# Patient Record
Sex: MALE | Race: WHITE | NOT HISPANIC OR LATINO | Employment: FULL TIME | URBAN - METROPOLITAN AREA
[De-identification: names, ages, dates, MRNs, and addresses within clinical notes are randomized per-mention and may not be internally consistent; named-entity substitution may affect disease eponyms.]

---

## 2023-03-01 LAB
MUCUS, URINE: NORMAL /LPF
RBC, URINE: 1 /HPF (ref 0–5)
SQUAMOUS EPITHELIAL CELLS, URINE: 1 /HPF
WBC, URINE: <1 /HPF (ref 0–5)

## 2023-03-02 LAB
CHLAMYDIA TRACH., AMPLIFIED: NEGATIVE
N. GONORRHEA, AMPLIFIED: NEGATIVE

## 2023-03-03 LAB — URINE CULTURE: ABNORMAL

## 2023-03-08 LAB — UREAPLASMA/MYCOPLASMA CULTURE: NORMAL

## 2023-11-06 ENCOUNTER — APPOINTMENT (OUTPATIENT)
Dept: DERMATOLOGY | Facility: HOSPITAL | Age: 38
End: 2023-11-06
Payer: COMMERCIAL

## 2023-12-20 ENCOUNTER — OFFICE VISIT (OUTPATIENT)
Dept: BEHAVIORAL HEALTH | Facility: CLINIC | Age: 38
End: 2023-12-20
Payer: COMMERCIAL

## 2023-12-20 VITALS — DIASTOLIC BLOOD PRESSURE: 59 MMHG | SYSTOLIC BLOOD PRESSURE: 127 MMHG | HEART RATE: 62 BPM

## 2023-12-20 DIAGNOSIS — F11.20 OPIOID USE DISORDER, SEVERE, DEPENDENCE (MULTI): Primary | ICD-10-CM

## 2023-12-20 DIAGNOSIS — F11.11 HX OF OPIOID ABUSE (MULTI): ICD-10-CM

## 2023-12-20 LAB
AMPHETAMINES UR QL SCN: ABNORMAL
BARBITURATES UR QL SCN: ABNORMAL
BENZODIAZ UR QL SCN: ABNORMAL
BZE UR QL SCN: ABNORMAL
CANNABINOIDS UR QL SCN: ABNORMAL
FENTANYL+NORFENTANYL UR QL SCN: ABNORMAL
OPIATES UR QL SCN: ABNORMAL
OXYCODONE+OXYMORPHONE UR QL SCN: ABNORMAL
PCP UR QL SCN: ABNORMAL

## 2023-12-20 PROCEDURE — 80354 DRUG SCREENING FENTANYL: CPT | Performed by: NURSE PRACTITIONER

## 2023-12-20 PROCEDURE — 90791 PSYCH DIAGNOSTIC EVALUATION: CPT | Performed by: COUNSELOR

## 2023-12-20 PROCEDURE — 80307 DRUG TEST PRSMV CHEM ANLYZR: CPT | Performed by: NURSE PRACTITIONER

## 2023-12-21 ENCOUNTER — APPOINTMENT (OUTPATIENT)
Dept: BEHAVIORAL HEALTH | Facility: CLINIC | Age: 38
End: 2023-12-21
Payer: COMMERCIAL

## 2023-12-21 NOTE — PROGRESS NOTES
ARS ASSESSMENT      NAME: Dada Delgado  MRN: [id number]  DATE: 12/21/23      Reason for Visit:   Dada is referred by a member of the Addiction Treatment community, John Garcia of [facility]. Dada overdosed on a fentanyl pill last weekend and had to be revived by the paramedics.     Diagnoses/Problems:  There is no problem list on file for this patient.     Provider Impression: Dada is a 38 year old male who is accompanied today by his girlfriend and his mother.  Dada overdosed on Sunday on a fentanyl pill.  He has a long history of polysubstance dependence. In recent years, he has mostly been using illicit Subutex. He used Subutex for 7 years, following 4 years of heavy use of oxycodone. He stopped the Subutex in October, 2023. He transferred over to [facility] and has been using Kratom daily for the past 3 months. On Sunday, he took a pill from the street and overdosed. He has used some drug on a daily basis for the past 25 years of his young life. His pattern of opioid use is quite unusual in that he transferred himself off of oxycodone and took illicit Subutex for 7 years. He now wants to stop all drugs.  However, he is ambivalent in that he believes that he cannot function, especially at work, without some version of opioid. In the end, he agreed to enter into the morning IOP program.  It is important that he see our CNP as soon as possible in order to discuss medication management. He believes he is experiencing protracted withdrawal from the Subutex withdrawal. But he stopped the Subutex in October. And, he is depressed at this time and may benefit from medication management. He will start tomorrow in IOP.     Level of Care Assessment:  D1: Acute Intx/Withdrawal Potential: 2  D2: Biomedical Conditions/Complications: 0  D3: Emtional Behavioral/Cog. Conditions Complications: 2  D4: Treatment Acceptance/Resistance: 1  D5: Relapse/Cont. Use/Cont. Problem Potential: 1  D6: Recovery Environment:  "0    Level of Care Recommended: Recommended LOC IOP  Level of Care Placed: IOP    Comments:     Readiness For Treatment:  preparation    Substance Use History:  Alcohol Drank in his 20's. Arrested for DUI at age 23. He states that his drinking these past 5 years has been very minimal. Maybe takes one drink one time per month.   Cocaine Used cocaine almost every weekend from age 21 - 31. No cocaine use in recent years.   Opioids Heavy illicit oxycodone use for 4 years. Used 150 - 270 mg oxycodone per day. Switched to illicit Subutex at age 31 and used 8 mg Subutex every day until October, 2023. Switched to Kratom and used 12 capsules of Kratom daily for past 3 months. Took an illicit fentanyl pill last Sunday and overdosed.  Cannabis: Used cannabis daily from age 14 to 36. Allegedly stopped cannabis 2 years ago.     Impact on Daily Life: home disruption and work disruption    History of Treatment:  No    Other Behaviors:  none    Past Psychiatric History:  Past psychiatric history Patient has never had any mental health treatment of pilo kind. States that he has struggled with depression for many years. He believes he has ADHD and thinks opioids help him work.  Never diagnosed other than by himself. Never had any panic attacks or suicidal ideation. Currently feeling ashamed and remorseful about his overdose.    Suicidal Ideation:  No  Suicidal Attempts:  No  Suicide Protective Factors:  cultural/Muslim beliefs, family/social support, no prior history of attempts, and future oriented  Neuropsychological Factors:  None noted    Psych Social History ARS:  Born and raised in \"[place] and [place]. Has one sibling. Graduated from high school. 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 Has a girlfriend whom he plans to propose to.   Abuse/Neglect History:  None  Relationship History:  currently in a relationship  Sexual History:  Sexual orientation Heterosexual   Education:  Vocational " training    Employment History:  job not satisfying  Full-time    History:  no history of  affiliation  Legal History:  Life time arrests DUI age 23.   Financial Stressors:    Cultural/Episcopalian/Spiritual Orientation:  Pentecostal. Practicing some.   Leisure/Recreation/Hobbies:    Collateral Information:    Past Medical History:  History    Surgical History:  No past surgical history on file.  Family History:  No family history on file.  Allergies:  Not on File  Current Meds:  No current outpatient medications on file.   Vitals:  There is no height or weight on file to calculate BSA.    Mental Status Exam:  General Appearance: fairly groomed  Attitude/Behavior: cooperative  Motor: no psychomotor agitation or retardation, no tremor or other abnormal movements  Speech: normal rate, volume, prosody  Gait/Station: WFL  Mood: euthymic, a bit anxious  Affect: anxious  Thought Process: linear, goal directed  Thought Associations: no loosening of associations  Thought Content: normal  Perception: no perceptual abnormalities noted  Sensorium: alert and oriented to person, place, time and situation  Insight: limited  Judgment: limited  Cognition: cognitively intact to conversational testing with respect to attention, orientation, fund of knowledge, recent and remote memory, and language  Testing: N/A      Pain Scale:  3  Pain Quality: unable to describe   Does your pain make it hard to do any of these things that you normally do?  work  Vitals:  There is no height or weight on file to calculate BSA.    Tobacco Screening:   Social History     Tobacco Use   Smoking Status Not on file   Smokeless Tobacco Not on file       Domestic Violence:      Elder Abuse:  No   Depression/Suicide Screening:      CSSR-S Score: Negative     PHQ-9 Score: 24    SIDDHARTH-7 Score: NA    Nutrition Screening:    Social Determinates of Health:  Social Determinants of Health     Tobacco Use: Not on file   Alcohol Use: Not on file   Financial  Resource Strain: Not on file   Food Insecurity: Not on file   Transportation Needs: Not on file   Physical Activity: Not on file   Stress: Not on file   Social Connections: Not on file   Intimate Partner Violence: Not on file   Depression: Not on file   Housing Stability: Not on file   Utilities: Not on file   Digital Equity: Not on file       Results Data:

## 2023-12-22 LAB — ETHYL GLUCURONIDE UR QL SCN: NEGATIVE NG/ML

## 2023-12-26 LAB
FENTANYL UR CFM-MCNC: <2.5 NG/ML
NORFENTANYL UR CFM-MCNC: 30.8 NG/ML

## 2024-01-16 NOTE — PROGRESS NOTES
ARS ASSESSMENT      NAME: Dada Delgado  MRN: [id number]  DATE: 01/16/24      Reason for Visit:   Dada is referred by a member of the Addiction Treatment community, John Garcia of [facility]. Dada overdosed on a fentanyl pill last weekend and had to be revived by the paramedics.     Diagnoses/Problems:  There is no problem list on file for this patient.     Provider Impression: Dada is a 38 year old male who is accompanied today by his girlfriend and his mother.  Dada overdosed on Sunday on a fentanyl pill.  He has a long history of polysubstance dependence. In recent years, he has mostly been using illicit Subutex. He used Subutex for 7 years, following 4 years of heavy use of oxycodone. He stopped the Subutex in October, 2023. He transferred over to [facility] and has been using Kratom daily for the past 3 months. On Sunday, he took a pill from the street and overdosed. He has used some drug on a daily basis for the past 25 years of his young life. His pattern of opioid use is quite unusual in that he transferred himself off of oxycodone and took illicit Subutex for 7 years. He now wants to stop all drugs.  However, he is ambivalent in that he believes that he cannot function, especially at work, without some version of opioid. In the end, he agreed to enter into the morning IOP program.  It is important that he see our CNP as soon as possible in order to discuss medication management. He believes he is experiencing protracted withdrawal from the Subutex withdrawal. But he stopped the Subutex in October. And, he is depressed at this time and may benefit from medication management. He will start tomorrow in IOP.     Level of Care Assessment:  D1: Acute Intx/Withdrawal Potential: 2  D2: Biomedical Conditions/Complications: 0  D3: Emtional Behavioral/Cog. Conditions Complications: 2  D4: Treatment Acceptance/Resistance: 1  D5: Relapse/Cont. Use/Cont. Problem Potential: 1  D6: Recovery Environment:  "0    Level of Care Recommended: Recommended LOC IOP  Level of Care Placed: IOP    Comments:     Readiness For Treatment:  preparation    Substance Use History:  Alcohol Drank in his 20's. Arrested for DUI at age 23. He states that his drinking these past 5 years has been very minimal. Maybe takes one drink one time per month.   Cocaine Used cocaine almost every weekend from age 21 - 31. No cocaine use in recent years.   Opioids Heavy illicit oxycodone use for 4 years. Used 150 - 270 mg oxycodone per day. Switched to illicit Subutex at age 31 and used 8 mg Subutex every day until October, 2023. Switched to Kratom and used 12 capsules of Kratom daily for past 3 months. Took an illicit fentanyl pill last Sunday and overdosed.  Cannabis: Used cannabis daily from age 14 to 36. Allegedly stopped cannabis 2 years ago.     Impact on Daily Life: home disruption and work disruption    History of Treatment:  No    Other Behaviors:  none    Past Psychiatric History:  Past psychiatric history Patient has never had any mental health treatment of pilo kind. States that he has struggled with depression for many years. He believes he has ADHD and thinks opioids help him work.  Never diagnosed other than by himself. Never had any panic attacks or suicidal ideation. Currently feeling ashamed and remorseful about his overdose.    Suicidal Ideation:  No  Suicidal Attempts:  No  Suicide Protective Factors:  cultural/Pentecostal beliefs, family/social support, no prior history of attempts, and future oriented  Neuropsychological Factors:  None noted    Psych Social History ARS:  Born and raised in [place] and [place]. Has one sibling. Graduated from high school. 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 Has a girlfriend whom he plans to propose to.   Abuse/Neglect History:  None  Relationship History:  currently in a relationship  Sexual History:  Sexual orientation Heterosexual   Education:  Vocational " training    Employment History:  job not satisfying  Full-time    History:  no history of  affiliation  Legal History:  Life time arrests DUI age 23.   Financial Stressors:    Cultural/Adventist/Spiritual Orientation:  Muslim. Practicing some.   Leisure/Recreation/Hobbies:    Collateral Information:    Past Medical History:  History    Surgical History:  No past surgical history on file.  Family History:  No family history on file.  Allergies:  Not on File  Current Meds:  No current outpatient medications on file.   Vitals:  There is no height or weight on file to calculate BSA.    Mental Status Exam:  General Appearance: fairly groomed  Attitude/Behavior: cooperative  Motor: no psychomotor agitation or retardation, no tremor or other abnormal movements  Speech: normal rate, volume, prosody  Gait/Station: WFL  Mood: euthymic, a bit anxious  Affect: anxious  Thought Process: linear, goal directed  Thought Associations: no loosening of associations  Thought Content: normal  Perception: no perceptual abnormalities noted  Sensorium: alert and oriented to person, place, time and situation  Insight: limited  Judgment: limited  Cognition: cognitively intact to conversational testing with respect to attention, orientation, fund of knowledge, recent and remote memory, and language  Testing: N/A      Pain Scale:  3  Pain Quality: unable to describe   Does your pain make it hard to do any of these things that you normally do?  work  Vitals:  There is no height or weight on file to calculate BSA.    Tobacco Screening:   Social History     Tobacco Use   Smoking Status Not on file   Smokeless Tobacco Not on file       Domestic Violence:      Elder Abuse:  No   Depression/Suicide Screening:      CSSR-S Score: Negative     PHQ-9 Score: 24    SIDDHARTH-7 Score: NA    Nutrition Screening:    Social Determinates of Health:  Social Determinants of Health     Tobacco Use: Not on file   Alcohol Use: Not on file   Financial  Resource Strain: Not on file   Food Insecurity: Not on file   Transportation Needs: Not on file   Physical Activity: Not on file   Stress: Not on file   Social Connections: Not on file   Intimate Partner Violence: Not on file   Depression: Not on file   Housing Stability: Not on file   Utilities: Not on file   Digital Equity: Not on file       Results Data:

## 2025-04-08 ENCOUNTER — TELEPHONE (OUTPATIENT)
Dept: PRIMARY CARE | Facility: CLINIC | Age: 40
End: 2025-04-08
Payer: COMMERCIAL

## 2025-05-05 ENCOUNTER — APPOINTMENT (OUTPATIENT)
Dept: PRIMARY CARE | Facility: CLINIC | Age: 40
End: 2025-05-05
Payer: COMMERCIAL

## 2025-05-05 VITALS
SYSTOLIC BLOOD PRESSURE: 118 MMHG | RESPIRATION RATE: 21 BRPM | HEART RATE: 55 BPM | DIASTOLIC BLOOD PRESSURE: 70 MMHG | HEIGHT: 72 IN | OXYGEN SATURATION: 98 % | WEIGHT: 197 LBS | BODY MASS INDEX: 26.68 KG/M2

## 2025-05-05 DIAGNOSIS — R53.83 OTHER FATIGUE: ICD-10-CM

## 2025-05-05 DIAGNOSIS — Z00.00 ANNUAL PHYSICAL EXAM: Primary | ICD-10-CM

## 2025-05-05 DIAGNOSIS — E78.00 HYPERCHOLESTEROLEMIA: ICD-10-CM

## 2025-05-05 DIAGNOSIS — N42.82 PROSTATITIS SYNDROME: ICD-10-CM

## 2025-05-05 DIAGNOSIS — I86.1 VARICOCELE: ICD-10-CM

## 2025-05-05 DIAGNOSIS — N50.819 PERSISTENT TESTICULAR PAIN: ICD-10-CM

## 2025-05-05 PROBLEM — N50.89 TESTIS MASS: Status: RESOLVED | Noted: 2025-05-05 | Resolved: 2025-05-05

## 2025-05-05 PROBLEM — L70.9 ACNE: Status: ACTIVE | Noted: 2025-05-05

## 2025-05-05 PROBLEM — A09 DIARRHEA OF INFECTIOUS ORIGIN: Status: RESOLVED | Noted: 2025-05-05 | Resolved: 2025-05-05

## 2025-05-05 PROBLEM — N50.89 TESTICULAR MASS: Status: RESOLVED | Noted: 2025-05-05 | Resolved: 2025-05-05

## 2025-05-05 PROBLEM — E86.1 HYPOVOLEMIA DEHYDRATION: Status: ACTIVE | Noted: 2025-05-05

## 2025-05-05 PROBLEM — L28.2 PRURITIC RASH: Status: RESOLVED | Noted: 2025-05-05 | Resolved: 2025-05-05

## 2025-05-05 PROBLEM — B36.9 CUTANEOUS FUNGAL INFECTION: Status: RESOLVED | Noted: 2025-05-05 | Resolved: 2025-05-05

## 2025-05-05 PROBLEM — K52.9 COLITIS, ACUTE: Status: ACTIVE | Noted: 2025-05-05

## 2025-05-05 PROBLEM — M54.12 CERVICAL RADICULOPATHY AT C5: Status: RESOLVED | Noted: 2025-05-05 | Resolved: 2025-05-05

## 2025-05-05 PROBLEM — B36.9 FUNGAL DERMATITIS: Status: RESOLVED | Noted: 2025-05-05 | Resolved: 2025-05-05

## 2025-05-05 PROBLEM — K52.9 COLITIS: Status: RESOLVED | Noted: 2025-05-05 | Resolved: 2025-05-05

## 2025-05-05 PROBLEM — M79.18 CERVICAL MYOFASCIAL PAIN SYNDROME: Status: RESOLVED | Noted: 2025-05-05 | Resolved: 2025-05-05

## 2025-05-05 PROBLEM — M50.90 CERVICAL DISC DISEASE: Status: ACTIVE | Noted: 2025-05-05

## 2025-05-05 PROBLEM — N34.3 DYSURIA-FREQUENCY SYNDROME: Status: ACTIVE | Noted: 2025-05-05

## 2025-05-05 PROBLEM — M62.89 PELVIC FLOOR DYSFUNCTION: Status: ACTIVE | Noted: 2025-05-05

## 2025-05-05 PROBLEM — N20.0 CALCULUS OF KIDNEY: Status: RESOLVED | Noted: 2025-05-05 | Resolved: 2025-05-05

## 2025-05-05 PROBLEM — M79.18 MYOFASCIAL PAIN: Status: ACTIVE | Noted: 2025-05-05

## 2025-05-05 PROCEDURE — 3008F BODY MASS INDEX DOCD: CPT | Performed by: INTERNAL MEDICINE

## 2025-05-05 PROCEDURE — 99395 PREV VISIT EST AGE 18-39: CPT | Performed by: INTERNAL MEDICINE

## 2025-05-05 PROCEDURE — 1036F TOBACCO NON-USER: CPT | Performed by: INTERNAL MEDICINE

## 2025-05-05 ASSESSMENT — ENCOUNTER SYMPTOMS
ENDOCRINE NEGATIVE: 1
PSYCHIATRIC NEGATIVE: 1
RESPIRATORY NEGATIVE: 1
HEMATOLOGIC/LYMPHATIC NEGATIVE: 1
GASTROINTESTINAL NEGATIVE: 1
ALLERGIC/IMMUNOLOGIC NEGATIVE: 1
EYES NEGATIVE: 1
MUSCULOSKELETAL NEGATIVE: 1
CARDIOVASCULAR NEGATIVE: 1
NEUROLOGICAL NEGATIVE: 1
CONSTITUTIONAL NEGATIVE: 1

## 2025-05-05 NOTE — ASSESSMENT & PLAN NOTE
Educate diet and lifestyle changes and increase in physical activity  - he exerices every day and runs 2 miles and weight lifting and k[employer]

## 2025-05-05 NOTE — PROGRESS NOTES
Subjective   Patient ID: Dada Delgado is a 39 y.o. male who presents for Annual Exam (cpe).  Throbbing in testicles radiating down the legs and followed by urology in the past     HPI     Review of Systems   Constitutional: Negative.    HENT: Negative.     Eyes: Negative.    Respiratory: Negative.     Cardiovascular: Negative.    Gastrointestinal: Negative.    Endocrine: Negative.    Musculoskeletal: Negative.    Skin: Negative.    Allergic/Immunologic: Negative.    Neurological: Negative.    Hematological: Negative.    Psychiatric/Behavioral: Negative.     All other systems reviewed and are negative.      Objective   /70   Pulse 55   Resp 21   Ht 1.829 m (6')   Wt 89.4 kg (197 lb)   SpO2 98%   BMI 26.72 kg/m²     Physical Exam  Vitals and nursing note reviewed.   Constitutional:       Appearance: Normal appearance.   HENT:      Head: Normocephalic and atraumatic.      Right Ear: Tympanic membrane, ear canal and external ear normal.      Left Ear: Tympanic membrane, ear canal and external ear normal. There is no impacted cerumen.      Nose: Nose normal.      Mouth/Throat:      Mouth: Mucous membranes are moist.      Pharynx: Oropharynx is clear.   Eyes:      Extraocular Movements: Extraocular movements intact.      Conjunctiva/sclera: Conjunctivae normal.      Pupils: Pupils are equal, round, and reactive to light.   Cardiovascular:      Rate and Rhythm: Normal rate and regular rhythm.      Pulses: Normal pulses.      Heart sounds: Normal heart sounds. No murmur heard.  Pulmonary:      Effort: Pulmonary effort is normal. No respiratory distress.      Breath sounds: Normal breath sounds. No stridor. No wheezing, rhonchi or rales.   Chest:      Chest wall: No tenderness.   Abdominal:      General: Abdomen is flat. Bowel sounds are normal. There is no distension.      Palpations: Abdomen is soft. There is no mass.      Tenderness: There is no abdominal tenderness. There is no right CVA tenderness, left  CVA tenderness, guarding or rebound.      Hernia: No hernia is present.   Musculoskeletal:         General: Normal range of motion.      Cervical back: Normal range of motion and neck supple.   Skin:     General: Skin is warm.      Capillary Refill: Capillary refill takes less than 2 seconds.   Neurological:      General: No focal deficit present.      Mental Status: He is alert.      Cranial Nerves: No cranial nerve deficit.      Sensory: No sensory deficit.      Motor: No weakness.      Coordination: Coordination normal.      Gait: Gait normal.      Deep Tendon Reflexes: Reflexes normal.   Psychiatric:         Mood and Affect: Mood normal.         Behavior: Behavior normal. Behavior is cooperative.         Thought Content: Thought content normal.         Judgment: Judgment normal.         Assessment/Plan   Problem List Items Addressed This Visit           ICD-10-CM    Persistent testicular pain N50.819    Possibly due to mild epididymitis and varicocele with prolonged standing          Prostatitis syndrome N42.82    Educate diet and lifestyle changes and increase in physical activity  - he exerices every day and runs 2 miles and weight lifting and kamilla statics          Relevant Orders    Prostate Specific Antigen    Varicocele I86.1    Associated with discomfort a         Annual physical exam - Primary Z00.00    No recent hospitalizations.    All medications reviewed and reconciled by me the provider..  No use of controlled substances or opiates.    Family history, social history reviewed, no changes.    Patient does not smoke.    Patient does not drink.    Patient hydrates adequately daily.  Eats a well-balanced healthy diet.     Exercises adequately including walking and doing weightbearing exercises.    Patient denies any difficulty with memory or cognition.     Denies any difficulty with hearing.  Patient does not wear hearing aids.    No fall risk.  No recent falls.  Denies any difficulty walking.    Patient  with no history of depression anxiety, denies any loss of interest, no feeling of sadness, no lack of motivation.               Relevant Orders    CBC and Auto Differential    Comprehensive Metabolic Panel    Hypercholesterolemia E78.00    Hypercholesterolemia - Monitor lipid profile and educate patient upon risks of high cholesterol and targets. Educate diet and change in lifestyle and increase in exercises -   and educate compliance with medication and diet.           Relevant Orders    Lipid Panel    Other fatigue R53.83    Fatigue - check CMP(metabolic panel and elctrolytes) , CBC(complete blood cell count), TSH(thyroid function). Insomnia may play a role and sleep studies(rule out sleep apnea) are recommended . Educate sleep hygiene. Consider anxiety disorder vs. depression. Consider Stress test, and 2DECHO.           Relevant Orders    CBC and Auto Differential    TSH with reflex to Free T4 if abnormal

## 2025-05-05 NOTE — ASSESSMENT & PLAN NOTE
No recent hospitalizations.    All medications reviewed and reconciled by me the provider..  No use of controlled substances or opiates.    Family history, social history reviewed, no changes.    Patient does not smoke.    Patient does not drink.    Patient hydrates adequately daily.  Eats a well-balanced healthy diet.     Exercises adequately including walking and doing weightbearing exercises.    Patient denies any difficulty with memory or cognition.     Denies any difficulty with hearing.  Patient does not wear hearing aids.    No fall risk.  No recent falls.  Denies any difficulty walking.    Patient with no history of depression anxiety, denies any loss of interest, no feeling of sadness, no lack of motivation.

## 2025-05-09 ENCOUNTER — TELEPHONE (OUTPATIENT)
Dept: PRIMARY CARE | Facility: CLINIC | Age: 40
End: 2025-05-09
Payer: COMMERCIAL

## 2025-05-09 DIAGNOSIS — R05.1 ACUTE COUGH: ICD-10-CM

## 2025-05-09 RX ORDER — FEXOFENADINE HCL 180 MG/1
180 TABLET ORAL DAILY
Qty: 30 TABLET | Refills: 0 | Status: SHIPPED | OUTPATIENT
Start: 2025-05-09

## 2025-05-09 RX ORDER — AZITHROMYCIN 500 MG/1
500 TABLET, FILM COATED ORAL DAILY
Qty: 6 TABLET | Refills: 0 | Status: SHIPPED | OUTPATIENT
Start: 2025-05-09 | End: 2025-05-15

## 2025-05-09 RX ORDER — FLUTICASONE PROPIONATE 50 MCG
1 SPRAY, SUSPENSION (ML) NASAL DAILY
Qty: 16 G | Refills: 0 | Status: SHIPPED | OUTPATIENT
Start: 2025-05-09

## 2025-05-09 NOTE — TELEPHONE ENCOUNTER
OK start him on start Azithromycin 500 mg daily for 6 days and Allegra 180 mg daily and Flonase I puff BID and avoids cold exposure

## 2025-05-30 LAB
ALBUMIN SERPL-MCNC: 4.5 G/DL (ref 3.6–5.1)
ALP SERPL-CCNC: 54 U/L (ref 36–130)
ALT SERPL-CCNC: 19 U/L (ref 9–46)
ANION GAP SERPL CALCULATED.4IONS-SCNC: 8 MMOL/L (CALC) (ref 7–17)
AST SERPL-CCNC: 22 U/L (ref 10–40)
BASOPHILS # BLD AUTO: 31 CELLS/UL (ref 0–200)
BASOPHILS NFR BLD AUTO: 0.8 %
BILIRUB SERPL-MCNC: 0.5 MG/DL (ref 0.2–1.2)
BUN SERPL-MCNC: 25 MG/DL (ref 7–25)
CALCIUM SERPL-MCNC: 9.4 MG/DL (ref 8.6–10.3)
CHLORIDE SERPL-SCNC: 102 MMOL/L (ref 98–110)
CHOLEST SERPL-MCNC: 204 MG/DL
CHOLEST/HDLC SERPL: 3.5 (CALC)
CO2 SERPL-SCNC: 27 MMOL/L (ref 20–32)
CREAT SERPL-MCNC: 1.11 MG/DL (ref 0.6–1.26)
EGFRCR SERPLBLD CKD-EPI 2021: 87 ML/MIN/1.73M2
EOSINOPHIL # BLD AUTO: 70 CELLS/UL (ref 15–500)
EOSINOPHIL NFR BLD AUTO: 1.8 %
ERYTHROCYTE [DISTWIDTH] IN BLOOD BY AUTOMATED COUNT: 12.8 % (ref 11–15)
GLUCOSE SERPL-MCNC: 100 MG/DL (ref 65–99)
HCT VFR BLD AUTO: 45.6 % (ref 38.5–50)
HDLC SERPL-MCNC: 59 MG/DL
HGB BLD-MCNC: 14.9 G/DL (ref 13.2–17.1)
LDLC SERPL CALC-MCNC: 129 MG/DL (CALC)
LYMPHOCYTES # BLD AUTO: 1622 CELLS/UL (ref 850–3900)
LYMPHOCYTES NFR BLD AUTO: 41.6 %
MCH RBC QN AUTO: 30.6 PG (ref 27–33)
MCHC RBC AUTO-ENTMCNC: 32.7 G/DL (ref 32–36)
MCV RBC AUTO: 93.6 FL (ref 80–100)
MONOCYTES # BLD AUTO: 484 CELLS/UL (ref 200–950)
MONOCYTES NFR BLD AUTO: 12.4 %
NEUTROPHILS # BLD AUTO: 1693 CELLS/UL (ref 1500–7800)
NEUTROPHILS NFR BLD AUTO: 43.4 %
NONHDLC SERPL-MCNC: 145 MG/DL (CALC)
PLATELET # BLD AUTO: 297 THOUSAND/UL (ref 140–400)
PMV BLD REES-ECKER: 9.6 FL (ref 7.5–12.5)
POTASSIUM SERPL-SCNC: 5.3 MMOL/L (ref 3.5–5.3)
PROT SERPL-MCNC: 7.2 G/DL (ref 6.1–8.1)
PSA SERPL-MCNC: 0.6 NG/ML
RBC # BLD AUTO: 4.87 MILLION/UL (ref 4.2–5.8)
SODIUM SERPL-SCNC: 137 MMOL/L (ref 135–146)
TRIGL SERPL-MCNC: 68 MG/DL
TSH SERPL-ACNC: 0.82 MIU/L (ref 0.4–4.5)
WBC # BLD AUTO: 3.9 THOUSAND/UL (ref 3.8–10.8)